# Patient Record
Sex: MALE | Race: OTHER | HISPANIC OR LATINO | URBAN - METROPOLITAN AREA
[De-identification: names, ages, dates, MRNs, and addresses within clinical notes are randomized per-mention and may not be internally consistent; named-entity substitution may affect disease eponyms.]

---

## 2023-03-24 ENCOUNTER — EMERGENCY (EMERGENCY)
Facility: HOSPITAL | Age: 78
LOS: 1 days | Discharge: HOME | End: 2023-03-24
Attending: STUDENT IN AN ORGANIZED HEALTH CARE EDUCATION/TRAINING PROGRAM | Admitting: EMERGENCY MEDICINE
Payer: MEDICARE

## 2023-03-24 VITALS
HEART RATE: 64 BPM | SYSTOLIC BLOOD PRESSURE: 137 MMHG | DIASTOLIC BLOOD PRESSURE: 61 MMHG | OXYGEN SATURATION: 99 % | RESPIRATION RATE: 17 BRPM | TEMPERATURE: 98 F

## 2023-03-24 VITALS
RESPIRATION RATE: 18 BRPM | OXYGEN SATURATION: 100 % | HEART RATE: 65 BPM | DIASTOLIC BLOOD PRESSURE: 70 MMHG | SYSTOLIC BLOOD PRESSURE: 126 MMHG | TEMPERATURE: 98 F

## 2023-03-24 PROCEDURE — 72125 CT NECK SPINE W/O DYE: CPT | Mod: 26,MA

## 2023-03-24 PROCEDURE — 99284 EMERGENCY DEPT VISIT MOD MDM: CPT | Mod: GC

## 2023-03-24 PROCEDURE — 73564 X-RAY EXAM KNEE 4 OR MORE: CPT | Mod: 26,LT

## 2023-03-24 PROCEDURE — 70450 CT HEAD/BRAIN W/O DYE: CPT | Mod: 26,MA

## 2023-03-24 RX ORDER — TETANUS TOXOID, REDUCED DIPHTHERIA TOXOID AND ACELLULAR PERTUSSIS VACCINE, ADSORBED 5; 2.5; 8; 8; 2.5 [IU]/.5ML; [IU]/.5ML; UG/.5ML; UG/.5ML; UG/.5ML
0.5 SUSPENSION INTRAMUSCULAR ONCE
Refills: 0 | Status: DISCONTINUED | OUTPATIENT
Start: 2023-03-24 | End: 2023-03-28

## 2023-03-24 NOTE — ED ADULT TRIAGE NOTE - CHIEF COMPLAINT QUOTE
Son Ken....he speak [place] ." Hour ago fell down apprx 4 concrete steps...he had dog on leash that lunged and pulled him down.. he said he was out for  few seconds...he has hematoma on back of head, and has left knee pain....he was able to get self up and called for help..after few minutes. " No blood thinners.Denies feeling dizzy, . Denies nausea, denies neck pain.  Hx htn, dM2

## 2023-03-24 NOTE — ED PROVIDER NOTE - OBJECTIVE STATEMENT
77-year-old male history of hypertension, high cholesterol, diabetes on insulin, presenting after fall with associated head pain and left knee pain.  Patient states was sitting on concrete stoop, pulled by dog's leash, hit head at the bottom of 4 steps, afterwards mild confusion, back to baseline within several seconds, complaining of only left knee pain and abrasion to scalp.  Patient denies any loss of consciousness, neck pain, no pain with lateral motion or vertical motion, denies abdominal pain, nausea, vomiting, difficulty breathing, states able to ambulate.

## 2023-03-24 NOTE — ED PROVIDER NOTE - NS ED ROS FT
GENERAL: No fever or chills  EYES: No change in vision  HEENT: No trouble swallowing or speaking  CARDIAC: No chest pain  PULMONARY: No cough or SOB  GI: No abdominal pain, no nausea or no vomiting, no diarrhea or constipation  : No changes in urination  SKIN: + abrasion to scalp  NEURO: No headache, no numbness  MSK: + L. knee pain  Otherwise as HPI or negative.

## 2023-03-24 NOTE — ED PROVIDER NOTE - CLINICAL SUMMARY MEDICAL DECISION MAKING FREE TEXT BOX
77 Y M presenting after fall with associated mild posterior scalp pain, abrasions, left knee pain.  Primary concern for rule intracranial hemorrhage in setting of advanced age over 65 years, head trauma, not a blood thinner medications at this time, no acute findings concerning for acute intracranial bleed, no neck pain concerning for cervical spine injury will image in setting of age, no significant tenderness to the left knee in setting of mechanism will evaluate for occult fracture with x-ray, likely DC if negative imaging.

## 2023-03-24 NOTE — ED PROVIDER NOTE - ATTENDING CONTRIBUTION TO CARE
I have personally performed a face to face medical and diagnostic evaluation of the patient. I have discussed with and reviewed the Resident's note and agree with the History, ROS, Physical Exam and MDM unless otherwise indicated. A brief summary of my personal evaluation and impression can be found below.    77-year-old male with no pertinent past medical history presenting after mechanical fall with left knee injury and left sided head injury.  Patient states that he was sitting on his porch when his dog pulled forward.  Denies any LOC.  Mild posterior headache where he hit his head.  No other complaints at this time.  Unknown last Tdap.  On exam, neuro intact, traumatic findings as detailed above.  Will obtain head CT given age, will obtain knee plain films to assess for fracture but unlikely given current presentation.  Plan for Tdap, pain control, reassess to dispo.  Likely DC with return precautions and follow-up instructions if imaging is negative. Maddi Silver, ED Attending

## 2023-03-24 NOTE — ED PROVIDER NOTE - PATIENT PORTAL LINK FT
You can access the FollowMyHealth Patient Portal offered by [facility] by registering at the following website: [email or web address]. By joining [employer]’s FollowMyHealth portal, you will also be able to view your health information using other applications (apps) compatible with our system.

## 2023-03-24 NOTE — ED ADULT TRIAGE NOTE - LANGUAGE ASSISTANCE NEEDED
request komal Rogers  for translation/No-Patient/Caregiver offered and refused free interpretation services.

## 2023-03-24 NOTE — ED PROVIDER NOTE - PHYSICAL EXAMINATION
Physical Exam:  General: NAD, Conversive  Eyes: EOMI, Conjunctiva and sclera clear  Neck: No JVD, no tenderness to palpation  Lungs: Clear to auscultation bilaterally, no wheeze, no rhonchi  Heart: Normal S1, S2, no murmurs  Abdomen: Soft, nontender, nondistended, no CVA tenderness  Extremities: 2+ peripheral pulses, no edema  Head: Abrasion to scalp, no laceration  Psych: AAO X3  Neurologic: Non-focal

## 2023-03-24 NOTE — ED ADULT NURSE NOTE - CHIEF COMPLAINT QUOTE
Son Ken....he speak [place] ." Hour ago fell down apprx 4 concrete steps...he had dog on leash that lunged and pulled him down.. he said he was out for  few seconds...he has hematoma on back of head, and has left knee pain....he was able to get self up and called for help..after few minutes. " No blood thinners.Denies feeling dizzy, . Denies nausea, denies neck pain.  Hx htn, dM2

## 2023-03-24 NOTE — ED ADULT NURSE NOTE - NSIMPLEMENTINTERV_GEN_ALL_ED
Implemented All Fall Risk Interventions:  [place] to call system. Call bell, personal items and telephone within reach. Instruct patient to call for assistance. Room bathroom lighting operational. Non-slip footwear when patient is off stretcher. Physically safe environment: no spills, clutter or unnecessary equipment. Stretcher in lowest position, wheels locked, appropriate side rails in place. Provide visual cue, wrist band, yellow gown, etc. Monitor gait and stability. Monitor for mental status changes and reorient to person, place, and time. Review medications for side effects contributing to fall risk. Reinforce activity limits and safety measures with patient and family.

## 2023-03-24 NOTE — ED ADULT NURSE NOTE - OBJECTIVE STATEMENT
A&Ox3. A&Ox4. PMH: DM, HLD, and HTN. Patient was sitting on his front steps with his dog when the dog lunged forward. The dogs leash  was wrapped around the patient's legs and he fell forward, going down on his left knee and hitting the back of his head. Patient is unaware if he LOC, but it took him 3-4 minutes to reorientate himself. The fall was unwitnessed. The patient's son brought the patient in due to his age and because he hit his head. Patient denies dizziness, N/V or SOB. Respirations even and unlabored, sating 99% on RA. Normal sinus on monitor. Safety precautions in place, bed in lowest position, and oriented to call bed. Awaiting diagnostic testing and results.